# Patient Record
Sex: FEMALE | Race: WHITE | Employment: FULL TIME | URBAN - METROPOLITAN AREA
[De-identification: names, ages, dates, MRNs, and addresses within clinical notes are randomized per-mention and may not be internally consistent; named-entity substitution may affect disease eponyms.]

---

## 2019-04-24 ENCOUNTER — HOSPITAL ENCOUNTER (EMERGENCY)
Age: 24
Discharge: HOME | End: 2019-04-24
Attending: EMERGENCY MEDICINE
Payer: SELF-PAY

## 2019-04-24 VITALS
WEIGHT: 237.3 LBS | RESPIRATION RATE: 14 BRPM | BODY MASS INDEX: 47.84 KG/M2 | SYSTOLIC BLOOD PRESSURE: 141 MMHG | DIASTOLIC BLOOD PRESSURE: 92 MMHG | HEART RATE: 93 BPM | OXYGEN SATURATION: 97 % | TEMPERATURE: 98.3 F | HEIGHT: 59 IN

## 2019-04-24 DIAGNOSIS — J02.9 SORE THROAT: Primary | ICD-10-CM

## 2019-04-24 DIAGNOSIS — H65.191 OTHER NON-RECURRENT ACUTE NONSUPPURATIVE OTITIS MEDIA OF RIGHT EAR: ICD-10-CM

## 2019-04-24 PROCEDURE — 87077 CULTURE AEROBIC IDENTIFY: CPT

## 2019-04-24 PROCEDURE — 99282 EMERGENCY DEPT VISIT SF MDM: CPT

## 2019-04-24 PROCEDURE — 87081 CULTURE SCREEN ONLY: CPT

## 2019-04-24 RX ORDER — AMOXICILLIN AND CLAVULANATE POTASSIUM 875; 125 MG/1; MG/1
1 TABLET, FILM COATED ORAL 2 TIMES DAILY
Qty: 14 TAB | Refills: 0 | Status: SHIPPED | OUTPATIENT
Start: 2019-04-24 | End: 2019-05-01

## 2019-04-24 NOTE — LETTER
[facility] EMERGENCY DEPT 
[address] [place] [id number] [phone] Work/School Note Date: 4/24/2019 To Whom It May concern: 
 
Clemon Kim was seen and treated today in the emergency room by the following provider(s): 
Attending Provider: Eligah Sicard, MD 
Physician Assistant: Shefali Liu PA-C. Clemon Kim may return to work on 4/26/2019 or sooner if symptoms markedly improve. Sincerely, Cele Isbell PA-C

## 2019-04-25 NOTE — ED NOTES
I have reviewed discharge instructions with the patient. The patient verbalized understanding. Patient armband removed and given to patient to take home. Patient was informed of the privacy risks if armband lost or stolen. 1 prescription given. Pt ambulated out of the ED.

## 2019-04-25 NOTE — DISCHARGE INSTRUCTIONS

## 2019-04-25 NOTE — ED PROVIDER NOTES
EMERGENCY DEPARTMENT HISTORY AND PHYSICAL EXAM 
 
Date: 4/24/2019 Patient Name: Mitzy Toscano History of Presenting Illness Chief Complaint Patient presents with  Ear Pain  Sore Throat  Nasal Congestion History Provided By: Patient Chief Complaint: sore throat Duration: 3 Days Timing:  Gradual 
Location: ENT Quality: Aching Severity: Moderate Modifying Factors: none Associated Symptoms: Right ear pain, nasal congestion HPI: Mitzy Toscano is a 26 y.o. female with a PMH of No significant past medical history who presents to the ER complaining of sore throat, nasal congestion and right ear pain. Patient states her symptoms began approximately 3 days ago. She tried taking NyQuil for symptoms with no relief. She denied any recent fevers. She is unsure of any recent sick contacts. She denies any difficulty swallowing, change in voice and has no other symptoms or complaints. PCP: None Past History Past Medical History: 
History reviewed. No pertinent past medical history. Past Surgical History: 
History reviewed. No pertinent surgical history. Family History: 
History reviewed. No pertinent family history. Social History: 
Social History Tobacco Use  Smoking status: Never Smoker Substance Use Topics  Alcohol use: Yes  Drug use: Never Allergies: 
No Known Allergies Review of Systems Review of Systems Constitutional: Negative for chills, fatigue and fever. HENT: Positive for congestion, ear pain and sore throat. Eyes: Negative. Respiratory: Negative for cough and shortness of breath. Cardiovascular: Negative for chest pain and palpitations. Gastrointestinal: Negative for abdominal pain, nausea and vomiting. Genitourinary: Negative for dysuria. Musculoskeletal: Negative. Skin: Negative. Neurological: Negative for dizziness, weakness, light-headedness and headaches. Psychiatric/Behavioral: Negative. All other systems reviewed and are negative. Physical Exam  
 
Vitals:  
 04/24/19 2227 BP: (!) 141/92 Pulse: 93 Resp: 14 Temp: 98.3 °F (36.8 °C) SpO2: 97% Weight: 107.6 kg (237 lb 4.8 oz) Height: 4' 11\" (1.499 m) Physical Exam  
Constitutional: She is oriented to person, place, and time. She appears well-developed and well-nourished. No distress. HENT:  
Head: Normocephalic and atraumatic. Right Ear: External ear and ear canal normal. Tympanic membrane is injected. Left Ear: Tympanic membrane, external ear and ear canal normal.  
Mouth/Throat: Uvula is midline and mucous membranes are normal. Posterior oropharyngeal erythema present. No oropharyngeal exudate, posterior oropharyngeal edema or tonsillar abscesses. Eyes: Pupils are equal, round, and reactive to light. Conjunctivae are normal. No scleral icterus. Neck: Neck supple. No JVD present. No tracheal deviation present. Cardiovascular: Normal rate, regular rhythm and normal heart sounds. No murmur heard. Pulmonary/Chest: Effort normal and breath sounds normal. No respiratory distress. She has no wheezes. She has no rales. She exhibits no tenderness. Abdominal: Soft. There is no tenderness. Musculoskeletal: Normal range of motion. Lymphadenopathy:  
  She has no cervical adenopathy. Neurological: She is alert and oriented to person, place, and time. She has normal strength. Gait normal. GCS eye subscore is 4. GCS verbal subscore is 5. GCS motor subscore is 6. Skin: Skin is warm and dry. She is not diaphoretic. Psychiatric: She has a normal mood and affect. Nursing note and vitals reviewed. Diagnostic Study Results Labs - Recent Results (from the past 12 hour(s)) STREP THROAT SCREEN Collection Time: 04/24/19 10:55 PM  
Result Value Ref Range Special Requests: NO SPECIAL REQUESTS Strep Screen NEGATIVE Culture result: PENDING Radiologic Studies - No orders to display CT Results  (Last 48 hours) None CXR Results  (Last 48 hours) None Medical Decision Making I am the first provider for this patient. I reviewed the vital signs, available nursing notes, past medical history, past surgical history, family history and social history. Vital Signs-Reviewed the patient's vital signs. Records Reviewed: Nursing Notes and Old Medical Records 603 PM 
26-year-old female who presents the ER complaining of sore throat times several days and right ear pain x3 hours. Patient took NyQuil last night for her symptoms with mild relief. Denied any associated fevers and is unsure of any recent sick contacts. Will plan on strep throat screen to rule out infection. Patient asking for work note at this time. Lisette Segura PA-C 
  
11:27 PM 
Strep negative. Will plan on tx for AOM. All questions answered and patient in agreement with plan of care. Will plan for discharge. Lisette Segura PA-C Disposition: 
discharged DISCHARGE NOTE:  
 
  Care plan outlined and precautions discussed. Patient has no new complaints, changes, or physical findings. Results of labs were reviewed with the patient. All medications were reviewed with the patient; will d/c home with augmentin. All of pt's questions and concerns were addressed. Patient was instructed and agrees to follow up with pcp, as well as to return to the ED upon further deterioration. Patient is ready to go home. Follow-up Information Follow up With Specialties Details Why Contact [facility] EMERGENCY DEPT Emergency Medicine  If symptoms worsen [id number] 
[phone] [address] Call in 1 day primary care follow up [place] [phone] ([facility]) [phone] Discharge Medication List as of 4/24/2019 11:17 PM  
  
START taking these medications Details amoxicillin-clavulanate (AUGMENTIN) 875-125 mg per tablet Take 1 Tab by mouth two (2) times a day for 7 days. , Print, Disp-14 Tab, R-0 Provider Notes (Medical Decision Making):  
 
Procedures: 
Procedures Diagnosis Clinical Impression: 1. Sore throat 2. Other non-recurrent acute nonsuppurative otitis media of right ear

## 2019-04-26 LAB
B-HEM STREP THROAT QL CULT: NEGATIVE
BACTERIA SPEC CULT: ABNORMAL
BACTERIA SPEC CULT: ABNORMAL
SERVICE CMNT-IMP: ABNORMAL